# Patient Record
(demographics unavailable — no encounter records)

---

## 2024-12-21 NOTE — ASSESSMENT
[FreeTextEntry1] : Annual physical routine preventative care and immunizations discussed with the patient healthy lifestyle and exercises PSA colonoscopy blood work checked Diabetes hemoglobin A1c 7.5 up from 7 in August.  I discussed with the patient the diabetes is not under control and can further affect his other medical conditions such as CKD.  Patient needs to adjust his diet and obtain weight loss diabetic education discussed with the patient continue current regimen annual ophthalmology visits Abnormal EKG referral to cardiology Anemia monitor labs follow-up hematology Hypertension stable continue current regimen Hyperkalemia secondary to CKD on Lokelma monitor labs Hyperlipidemia continue statin check lipids CKD monitor labs follow-up nephrology Diabetic neuropathy symptoms controlled Follow-up 3 months/pending results

## 2024-12-21 NOTE — HISTORY OF PRESENT ILLNESS
[FreeTextEntry1] : Annual physical [de-identified] : Patient presents accompanied by his wife. Patient gained 13 pounds since the last visit.  States that he has been having access to all of his medications but does not have good diet

## 2025-03-21 NOTE — COUNSELING
[Fall prevention counseling provided] : Fall prevention counseling provided [Adequate lighting] : Adequate lighting [No throw rugs] : No throw rugs [Use proper foot wear] : Use proper foot wear [Use recommended devices] : Use recommended devices [Behavioral health counseling provided] : Behavioral health counseling provided [Sleep ___ hours/day] : Sleep [unfilled] hours/day [Engage in a relaxing activity] : Engage in a relaxing activity [Plan in advance] : Plan in advance [None] : None [Good understanding] : Patient has a good understanding of lifestyle changes and steps needed to achieve self management goal [de-identified] : Total face-to-face time with patient -15 minutes; >50% involved counselling, review of labs/tests, and/or coordination of medical care:

## 2025-03-21 NOTE — END OF VISIT
[FreeTextEntry4] : I Joe Du, am scribing for and in the presence of Dr. Sanz, the following sections of:  HISTORY OF PRESENT ILLNESS, PAST MEDICAL/FAMILY/SOCIAL HISTORY, ROS, VITALS, PE, DISPOSITION I Frandy Bradshaw, personally performed the services described in the documentation, reviewed the documentation recorded by the scribe in my presence and it accurately and completely records my words and actions.

## 2025-03-21 NOTE — ASSESSMENT
[FreeTextEntry1] : Annual physical routine preventative care and immunizations discussed with the patient healthy lifestyle and exercises PSA colonoscopy blood work checked Diabetes hemoglobin A1c 7.5 up from 7 in August.  I discussed with the patient the diabetes is not under control and can further affect his other medical conditions such as CKD.  Patient needs to adjust his diet and obtain weight loss diabetic education discussed with the patient continue current regimen annual ophthalmology visits Abnormal EKG referral to cardiology Anemia monitor labs follow-up hematology Hypertension stable continue current regimen Hyperkalemia secondary to CKD on Lokelma monitor labs Hyperlipidemia continue statin check lipids CKD monitor labs follow-up nephrology Diabetic neuropathy symptoms controlled Follow-up 3 months/pending results    Discussed the importance and benefit of a healthy lifestyle, including a heart-healthy diet such as the Mediterranean diet, regular exercise, and 7 hours of sleep nightly.  Total time 30 min ( 20 min. FTF and 10 min chart review and documentation.)

## 2025-03-21 NOTE — HISTORY OF PRESENT ILLNESS
[Spouse] : spouse [FreeTextEntry1] : Follow up  [de-identified] : 65 year-old male presents for a routine follow up, [facility]. cc: Presents for a follow up visit. Has been walking for exercise and avoiding red meats and eating less bread, down 7lbs. He has been checking his sugars; they have been between 130-135. A1c is still elevated at 7.5. He had trouble with insurance covering his Ozempic and states he did not take it for two months his daughter states the copay is over $500, they applied to a savings program but have not heard from them. He just started taking it again.  Hx of: Anemia, DM, HLD, CKD 3 Patient voices no further complaints. Ros noted below Denies fever, cough, chills, body aches and sob.

## 2025-04-11 NOTE — HISTORY OF PRESENT ILLNESS
[Spouse] : spouse [FreeTextEntry1] : Follow up  [de-identified] : 65 65-year-old male presents for a routine follow-up, [facility]. He has been checking his sugars; they have been between 130-135. A1c is still elevated at 7.5. his Ozempic resume and titrate up to 2 mg from 1 mg subcutaneous weekly, patient tolerating well.  The patient also requesting handicap form plate, due to prior history of total right hip replacement, complaining of pain when he walks in to park long distance, patient also complaining of abdominal distention, GERD symptoms on and off, states his wife was recently diagnosed with H. pylori positive, he said he had similar symptoms MIV positive for H. pylori, willing to test for urea breath test.. Hx of: Anemia, DM, HLD, CKD 3 Patient voices no further complaints. Ros noted below Denies fever, cough, chills, body aches and sob.

## 2025-04-11 NOTE — ASSESSMENT
[FreeTextEntry1] :  Diabetes hemoglobin A1c 7.5 up from 7 in August.  I discussed with the patient that diabetes is not under control and can further affect his other medical conditions, such as CKD.  Patient needs to adjust his diet and obtain weight loss diabetic education discussed with the patient continue current regimen annual ophthalmology visits  Anemia monitor labs follow-up hematology Hypertension stable continue current regimen Hyperlipidemia continues statin check lipids CKD monitor labs follow-up nephrology Diabetic neuropathy symptoms controlled Follow-up 3 months/pending results    Discussed the importance and benefit of a healthy lifestyle, including a heart-healthy diet such as the Mediterranean diet, regular exercise, and 7 hours of sleep nightly.  Total time 30 min ( 20 min. FTF and 10 min chart review and documentation.)

## 2025-04-11 NOTE — COUNSELING
[Fall prevention counseling provided] : Fall prevention counseling provided [Adequate lighting] : Adequate lighting [No throw rugs] : No throw rugs [Use proper foot wear] : Use proper foot wear [Use recommended devices] : Use recommended devices [Behavioral health counseling provided] : Behavioral health counseling provided [Sleep ___ hours/day] : Sleep [unfilled] hours/day [Engage in a relaxing activity] : Engage in a relaxing activity [Plan in advance] : Plan in advance [None] : None [Good understanding] : Patient has a good understanding of lifestyle changes and steps needed to achieve self management goal [de-identified] : Total face-to-face time with patient -15 minutes; >50% involved counselling, review of labs/tests, and/or coordination of medical care:

## 2025-06-05 NOTE — HISTORY OF PRESENT ILLNESS
[FreeTextEntry1] : f/u [de-identified] : 65-year-old male presents for routine follow-up and general health maintenance. He reports home blood glucose readings consistently between 130-135 mg/dL, but his most recent A1c remains elevated at 7.5%. He recently resumed Ozempic (semaglutide) and has titrated from 1 mg to 2 mg weekly, which he is tolerating well without adverse effects. Additionally, he reports a history of abdominal distention and intermittent GERD symptoms, similar to those experienced by his wife, who was recently diagnosed with H. pylori infection. He was subsequently tested and found positive for H. pylori. He completed a course of triple therapy (PPI, amoxicillin, clarithromycin) and now reports clinical improvement in abdominal symptoms. He is willing to undergo urea breath test to confirm eradication and has been advised to stop PPIs and antibiotics for 2 weeks prior to testing.  PMH: Type 2 diabetes mellitus, hyperlipidemia, CKD stage 3, anemia, s/p right total hip arthroplasty.

## 2025-06-05 NOTE — ASSESSMENT
[FreeTextEntry1] : 1.	Type 2 Diabetes Mellitus  	-	A1c 7.5% with home glucose avg 130-135 	-	Continue Ozempic 2 mg weekly; patient tolerating well 	-	Reinforce diet, lifestyle, and SMBG 	-	Recheck A1c in 3 months 	2./p H. pylori infection  	-	Completed triple therapy (PPI + amoxicillin + clarithromycin) 	-	Patient is currently asymptomatic 	-	Will order urea breath test in 4-6 weeks post-treatment to confirm eradication 	-	Reinforce no PPI, antibiotics, or bismuth for 2 weeks prior to test	3.	Chronic pain due to right hip arthroplasty  	-	Pain with long-distance walking 	-	Complete handicap placard form per patient request 	4.	Chronic Kidney Disease Stage 3 (N18.30): 	-	Stable, monitor renal function every 6 months 	-	Avoid nephrotoxic medications 	5.	Anemia (D64.9): 	-	Asymptomatic; monitor CBC 	-	No signs of GI bleed; monitor if H. pylori positive 	6.	Hyperlipidemia (E78.5): 	-	Continue statin therapy (if prescribed) 	-	Lipid panel due if not checked within last 6 months   Discussed the importance and benefit of a healthy lifestyle, including a heart-healthy diet such as the Mediterranean diet, regular exercise, and 7 hours of sleep nightly.  Total time 30 min ( 20 min. FTF and 10 min chart review and documentation.)

## 2025-06-05 NOTE — HISTORY OF PRESENT ILLNESS
[FreeTextEntry1] : f/u [de-identified] : 65-year-old male presents for routine follow-up and general health maintenance. He reports home blood glucose readings consistently between 130-135 mg/dL, but his most recent A1c remains elevated at 7.5%. He recently resumed Ozempic (semaglutide) and has titrated from 1 mg to 2 mg weekly, which he is tolerating well without adverse effects. Additionally, he reports a history of abdominal distention and intermittent GERD symptoms, similar to those experienced by his wife, who was recently diagnosed with H. pylori infection. He was subsequently tested and found positive for H. pylori. He completed a course of triple therapy (PPI, amoxicillin, clarithromycin) and now reports clinical improvement in abdominal symptoms. He is willing to undergo urea breath test to confirm eradication and has been advised to stop PPIs and antibiotics for 2 weeks prior to testing.  PMH: Type 2 diabetes mellitus, hyperlipidemia, CKD stage 3, anemia, s/p right total hip arthroplasty.

## 2025-06-24 NOTE — PHYSICAL EXAM
-- DO NOT REPLY / DO NOT REPLY ALL --  -- Message is from the [facility]--    Provider paged via [employer] Documentation - The below message was copied and pasted from a Risk I/O page:    [phone] ACC CALLER NAME: GAIL ZAPATARIETTA RE: JOHN ZAPATA HENRIETTA PATIENT 1950 PATIENT PCP: ANTONY ANDERSON PATIENT CALLED IN STATES THAT SHE HAS BEEN WAITING ON HER PAIN MEDICATION FOR VOLTRIN TO BE SENT TO HER PHARMACY INSIST ON REACHING OUT [id number] [place], [place] - [address] [phone]     diclofenac (VOLTAREN) 1 % gel 454 g 0 3/15/2021     Sig - Route: Apply 8 g topically 2 times daily. - Topical    Sent to pharmacy as: Diclofenac Sodium 1 % External Gel (VOLTAREN)    Class: Eprescribe    E-Prescribing Status: Receipt confirmed by pharmacy (3/15/2021  8:48 PM CDT)         [No Acute Distress] : no acute distress [Normal Sclera/Conjunctiva] : normal sclera/conjunctiva [PERRL] : pupils equal round and reactive to light [EOMI] : extraocular movements intact [Normal Outer Ear/Nose] : the outer ears and nose were normal in appearance [Normal Oropharynx] : the oropharynx was normal [No JVD] : no jugular venous distention [No Lymphadenopathy] : no lymphadenopathy [Supple] : supple [Thyroid Normal, No Nodules] : the thyroid was normal and there were no nodules present [No Respiratory Distress] : no respiratory distress  [No Accessory Muscle Use] : no accessory muscle use [Clear to Auscultation] : lungs were clear to auscultation bilaterally [Normal Rate] : normal rate  [Regular Rhythm] : with a regular rhythm [Normal S1, S2] : normal S1 and S2 [No Murmur] : no murmur heard [No Carotid Bruits] : no carotid bruits [No Abdominal Bruit] : a ~M bruit was not heard ~T in the abdomen [No Varicosities] : no varicosities [Pedal Pulses Present] : the pedal pulses are present [No Edema] : there was no peripheral edema [No Palpable Aorta] : no palpable aorta [No Extremity Clubbing/Cyanosis] : no extremity clubbing/cyanosis [Soft] : abdomen soft [Non Tender] : non-tender [Non-distended] : non-distended [No Masses] : no abdominal mass palpated [No HSM] : no HSM [Normal Bowel Sounds] : normal bowel sounds [Normal Posterior Cervical Nodes] : no posterior cervical lymphadenopathy [Normal Anterior Cervical Nodes] : no anterior cervical lymphadenopathy [No CVA Tenderness] : no CVA  tenderness [No Spinal Tenderness] : no spinal tenderness [No Joint Swelling] : no joint swelling [Grossly Normal Strength/Tone] : grossly normal strength/tone [No Rash] : no rash [Coordination Grossly Intact] : coordination grossly intact [No Focal Deficits] : no focal deficits [Normal Gait] : normal gait [Deep Tendon Reflexes (DTR)] : deep tendon reflexes were 2+ and symmetric [Normal Affect] : the affect was normal [Normal Insight/Judgement] : insight and judgment were intact

## 2025-06-24 NOTE — HISTORY OF PRESENT ILLNESS
[de-identified] : 65 years old male with past medical history of CKD stage III, diabetes, hypertension, obesity, here today for follow-up on his recent blood work, discussed with patient worsening of renal function, recommend to continue with tight control of his diabetes, hypertension, continue with Farxiga, GLP-1 agonist, increase plenty of fluids, avoid nephrotoxic medication, referral for nephrologist provide, will repeat renal function test today   - A1c: 7.8% ? worsening from prior 7.5%   - Medications: On Jardiance, Ozempic 2 mg weekly (recently titrated and tolerated well),  - Plan: Reinforce medication adherence, low-carb diet, and exercise; continue Ozempic and monitor for further improvement    Chronic Kidney Disease (CKD) Stage 3: refeerred to nephrology  - eGFR: 30 mL/min ? (previous 42)  - Creatinine: 2.33 mg/dL ?  - ACR: 348 mg/g ? ? persistent macroalbuminuria  - Plan: Continue losartan, monitor BP, avoid nephrotoxins, follow renal diet   - Hyperlipidemia:  - HDL: 28 mg/dL ? (low)  - Triglycerides: Previously elevated (not shown), on atorvastatin 40 mg and Vascepa 1g  - Plan: Continue current therapy, reinforce omega-3-rich diet    - H. pylori Infection:  - Completed triple therapy (PPI, amoxicillin, clarithromycin)

## 2025-06-24 NOTE — HISTORY OF PRESENT ILLNESS
[de-identified] : 65 years old male with past medical history of CKD stage III, diabetes, hypertension, obesity, here today for follow-up on his recent blood work, discussed with patient worsening of renal function, recommend to continue with tight control of his diabetes, hypertension, continue with Farxiga, GLP-1 agonist, increase plenty of fluids, avoid nephrotoxic medication, referral for nephrologist provide, will repeat renal function test today   - A1c: 7.8% ? worsening from prior 7.5%   - Medications: On Jardiance, Ozempic 2 mg weekly (recently titrated and tolerated well),  - Plan: Reinforce medication adherence, low-carb diet, and exercise; continue Ozempic and monitor for further improvement    Chronic Kidney Disease (CKD) Stage 3: refeerred to nephrology  - eGFR: 30 mL/min ? (previous 42)  - Creatinine: 2.33 mg/dL ?  - ACR: 348 mg/g ? ? persistent macroalbuminuria  - Plan: Continue losartan, monitor BP, avoid nephrotoxins, follow renal diet   - Hyperlipidemia:  - HDL: 28 mg/dL ? (low)  - Triglycerides: Previously elevated (not shown), on atorvastatin 40 mg and Vascepa 1g  - Plan: Continue current therapy, reinforce omega-3-rich diet    - H. pylori Infection:  - Completed triple therapy (PPI, amoxicillin, clarithromycin)

## 2025-06-24 NOTE — ASSESSMENT
[FreeTextEntry1] :   Discussed the importance and benefit of a healthy lifestyle, including a heart-healthy diet such as the Mediterranean diet, regular exercise, and 7 hours of sleep nightly.  Total time 30 min ( 20 min. FTF and 10 min chart review and documentation.)